# Patient Record
Sex: MALE | Race: WHITE | NOT HISPANIC OR LATINO | Employment: UNEMPLOYED | URBAN - METROPOLITAN AREA
[De-identification: names, ages, dates, MRNs, and addresses within clinical notes are randomized per-mention and may not be internally consistent; named-entity substitution may affect disease eponyms.]

---

## 2018-01-29 ENCOUNTER — RESOLUTE PROFESSIONAL BILLING HOSPITAL PROF FEE (OUTPATIENT)
Dept: HOSPITALIST | Facility: MEDICAL CENTER | Age: 38
End: 2018-01-29
Payer: COMMERCIAL

## 2018-01-29 ENCOUNTER — HOSPITAL ENCOUNTER (OUTPATIENT)
Facility: MEDICAL CENTER | Age: 38
DRG: 433 | End: 2018-01-29
Payer: COMMERCIAL

## 2018-01-29 ENCOUNTER — HOSPITAL ENCOUNTER (INPATIENT)
Facility: MEDICAL CENTER | Age: 38
LOS: 3 days | DRG: 433 | End: 2018-02-01
Attending: HOSPITALIST | Admitting: HOSPITALIST
Payer: COMMERCIAL

## 2018-01-29 PROBLEM — E83.42 HYPOMAGNESEMIA: Status: ACTIVE | Noted: 2018-01-29

## 2018-01-29 PROBLEM — F17.200 TOBACCO DEPENDENCE: Status: ACTIVE | Noted: 2018-01-29

## 2018-01-29 PROBLEM — K70.30 ALC (ALCOHOLIC LIVER CIRRHOSIS) (HCC): Status: ACTIVE | Noted: 2018-01-29

## 2018-01-29 PROBLEM — R74.01 TRANSAMINITIS: Status: ACTIVE | Noted: 2018-01-29

## 2018-01-29 PROCEDURE — 770006 HCHG ROOM/CARE - MED/SURG/GYN SEMI*

## 2018-01-29 PROCEDURE — 99223 1ST HOSP IP/OBS HIGH 75: CPT | Performed by: HOSPITALIST

## 2018-01-29 RX ORDER — ATORVASTATIN CALCIUM 20 MG/1
40 TABLET, FILM COATED ORAL DAILY
COMMUNITY

## 2018-01-29 RX ORDER — CARVEDILOL 6.25 MG/1
25 TABLET ORAL 2 TIMES DAILY WITH MEALS
Status: DISCONTINUED | OUTPATIENT
Start: 2018-01-30 | End: 2018-02-01 | Stop reason: HOSPADM

## 2018-01-29 RX ORDER — CARVEDILOL 25 MG/1
25 TABLET ORAL 2 TIMES DAILY WITH MEALS
COMMUNITY

## 2018-01-29 RX ORDER — RANITIDINE 150 MG/1
150 TABLET ORAL 2 TIMES DAILY
COMMUNITY

## 2018-01-29 RX ORDER — LISINOPRIL 5 MG/1
5 TABLET ORAL 2 TIMES DAILY
Status: DISCONTINUED | OUTPATIENT
Start: 2018-01-30 | End: 2018-02-01 | Stop reason: HOSPADM

## 2018-01-29 RX ORDER — ONDANSETRON 4 MG/1
4 TABLET, ORALLY DISINTEGRATING ORAL EVERY 4 HOURS PRN
Status: DISCONTINUED | OUTPATIENT
Start: 2018-01-29 | End: 2018-02-01 | Stop reason: HOSPADM

## 2018-01-29 RX ORDER — POLYETHYLENE GLYCOL 3350 17 G/17G
1 POWDER, FOR SOLUTION ORAL
Status: DISCONTINUED | OUTPATIENT
Start: 2018-01-29 | End: 2018-02-01 | Stop reason: HOSPADM

## 2018-01-29 RX ORDER — AMOXICILLIN 250 MG
2 CAPSULE ORAL 2 TIMES DAILY
Status: DISCONTINUED | OUTPATIENT
Start: 2018-01-30 | End: 2018-02-01 | Stop reason: HOSPADM

## 2018-01-29 RX ORDER — LISINOPRIL 10 MG/1
5 TABLET ORAL 2 TIMES DAILY
COMMUNITY

## 2018-01-29 RX ORDER — PROMETHAZINE HYDROCHLORIDE 25 MG/1
12.5-25 TABLET ORAL EVERY 4 HOURS PRN
Status: DISCONTINUED | OUTPATIENT
Start: 2018-01-29 | End: 2018-02-01 | Stop reason: HOSPADM

## 2018-01-29 RX ORDER — PROMETHAZINE HYDROCHLORIDE 25 MG/1
12.5-25 SUPPOSITORY RECTAL EVERY 4 HOURS PRN
Status: DISCONTINUED | OUTPATIENT
Start: 2018-01-29 | End: 2018-02-01 | Stop reason: HOSPADM

## 2018-01-29 RX ORDER — LORAZEPAM 2 MG/ML
0.5 INJECTION INTRAMUSCULAR EVERY 6 HOURS PRN
Status: DISCONTINUED | OUTPATIENT
Start: 2018-01-29 | End: 2018-02-01 | Stop reason: HOSPADM

## 2018-01-29 RX ORDER — NICOTINE 21 MG/24HR
21 PATCH, TRANSDERMAL 24 HOURS TRANSDERMAL
Status: DISCONTINUED | OUTPATIENT
Start: 2018-01-30 | End: 2018-02-01 | Stop reason: HOSPADM

## 2018-01-29 RX ORDER — LORAZEPAM 1 MG/1
0.5 TABLET ORAL EVERY 6 HOURS PRN
Status: DISCONTINUED | OUTPATIENT
Start: 2018-01-29 | End: 2018-02-01 | Stop reason: HOSPADM

## 2018-01-29 RX ORDER — SPIRONOLACTONE 50 MG/1
25 TABLET, FILM COATED ORAL DAILY
Status: DISCONTINUED | OUTPATIENT
Start: 2018-01-30 | End: 2018-02-01 | Stop reason: HOSPADM

## 2018-01-29 RX ORDER — OMEPRAZOLE 20 MG/1
20 CAPSULE, DELAYED RELEASE ORAL 2 TIMES DAILY
Status: DISCONTINUED | OUTPATIENT
Start: 2018-01-30 | End: 2018-02-01 | Stop reason: HOSPADM

## 2018-01-29 RX ORDER — FUROSEMIDE 20 MG/1
20 TABLET ORAL DAILY
COMMUNITY

## 2018-01-29 RX ORDER — ATORVASTATIN CALCIUM 40 MG/1
40 TABLET, FILM COATED ORAL NIGHTLY
Status: DISCONTINUED | OUTPATIENT
Start: 2018-01-30 | End: 2018-02-01 | Stop reason: HOSPADM

## 2018-01-29 RX ORDER — BISACODYL 10 MG
10 SUPPOSITORY, RECTAL RECTAL
Status: DISCONTINUED | OUTPATIENT
Start: 2018-01-29 | End: 2018-02-01 | Stop reason: HOSPADM

## 2018-01-29 RX ORDER — ONDANSETRON 2 MG/ML
4 INJECTION INTRAMUSCULAR; INTRAVENOUS EVERY 4 HOURS PRN
Status: DISCONTINUED | OUTPATIENT
Start: 2018-01-29 | End: 2018-02-01 | Stop reason: HOSPADM

## 2018-01-29 RX ORDER — SPIRONOLACTONE 25 MG/1
25 TABLET ORAL DAILY
COMMUNITY

## 2018-01-29 ASSESSMENT — LIFESTYLE VARIABLES
PACK_YEARS: 23
CONSUMPTION TOTAL: POSITIVE
TOTAL SCORE: 4
EVER FELT BAD OR GUILTY ABOUT YOUR DRINKING: YES
HAVE PEOPLE ANNOYED YOU BY CRITICIZING YOUR DRINKING: YES
TOTAL SCORE: 4
DOES PATIENT WANT TO STOP DRINKING: YES
AVERAGE NUMBER OF DAYS PER WEEK YOU HAVE A DRINK CONTAINING ALCOHOL: 4
HOW MANY TIMES IN THE PAST YEAR HAVE YOU HAD 5 OR MORE DRINKS IN A DAY: 7
EVER_SMOKED: YES
TOTAL SCORE: 4
ALCOHOL_USE: YES
ON A TYPICAL DAY WHEN YOU DRINK ALCOHOL HOW MANY DRINKS DO YOU HAVE: 2
EVER HAD A DRINK FIRST THING IN THE MORNING TO STEADY YOUR NERVES TO GET RID OF A HANGOVER: YES
DOES PATIENT WANT TO TALK TO SOMEONE ABOUT QUITTING: NO
HAVE YOU EVER FELT YOU SHOULD CUT DOWN ON YOUR DRINKING: YES

## 2018-01-29 ASSESSMENT — PATIENT HEALTH QUESTIONNAIRE - PHQ9
SUM OF ALL RESPONSES TO PHQ QUESTIONS 1-9: 0
2. FEELING DOWN, DEPRESSED, IRRITABLE, OR HOPELESS: NOT AT ALL
SUM OF ALL RESPONSES TO PHQ QUESTIONS 1-9: 0
1. LITTLE INTEREST OR PLEASURE IN DOING THINGS: NOT AT ALL
1. LITTLE INTEREST OR PLEASURE IN DOING THINGS: NOT AT ALL
SUM OF ALL RESPONSES TO PHQ9 QUESTIONS 1 AND 2: 0
2. FEELING DOWN, DEPRESSED, IRRITABLE, OR HOPELESS: NOT AT ALL
SUM OF ALL RESPONSES TO PHQ9 QUESTIONS 1 AND 2: 0

## 2018-01-29 ASSESSMENT — PAIN SCALES - GENERAL: PAINLEVEL_OUTOF10: 5

## 2018-01-30 ENCOUNTER — APPOINTMENT (OUTPATIENT)
Dept: RADIOLOGY | Facility: MEDICAL CENTER | Age: 38
DRG: 433 | End: 2018-01-30
Attending: HOSPITALIST
Payer: COMMERCIAL

## 2018-01-30 PROBLEM — E87.6 HYPOKALEMIA: Status: ACTIVE | Noted: 2018-01-30

## 2018-01-30 PROBLEM — R18.8 ASCITES: Status: ACTIVE | Noted: 2018-01-30

## 2018-01-30 LAB
ALBUMIN SERPL BCP-MCNC: 3.1 G/DL (ref 3.2–4.9)
ALBUMIN/GLOB SERPL: 1.6 G/DL
ALP SERPL-CCNC: 234 U/L (ref 30–99)
ALT SERPL-CCNC: 50 U/L (ref 2–50)
ANION GAP SERPL CALC-SCNC: 9 MMOL/L (ref 0–11.9)
APPEARANCE FLD: CLEAR
APTT PPP: 34 SEC (ref 24.7–36)
AST SERPL-CCNC: 155 U/L (ref 12–45)
BASOPHILS # BLD AUTO: 0.8 % (ref 0–1.8)
BASOPHILS # BLD: 0.05 K/UL (ref 0–0.12)
BILIRUB SERPL-MCNC: 8 MG/DL (ref 0.1–1.5)
BODY FLD TYPE: NORMAL
BODY FLD TYPE: NORMAL
BUN SERPL-MCNC: 6 MG/DL (ref 8–22)
CALCIUM SERPL-MCNC: 7.9 MG/DL (ref 8.5–10.5)
CHLORIDE SERPL-SCNC: 102 MMOL/L (ref 96–112)
CO2 SERPL-SCNC: 24 MMOL/L (ref 20–33)
COLOR FLD: YELLOW
CREAT SERPL-MCNC: 0.65 MG/DL (ref 0.5–1.4)
CSF COMMENTS 1658: NORMAL
CYTOLOGY REG CYTOL: NORMAL
EOSINOPHIL # BLD AUTO: 0.12 K/UL (ref 0–0.51)
EOSINOPHIL NFR BLD: 2 % (ref 0–6.9)
ERYTHROCYTE [DISTWIDTH] IN BLOOD BY AUTOMATED COUNT: 50.8 FL (ref 35.9–50)
GLOBULIN SER CALC-MCNC: 2 G/DL (ref 1.9–3.5)
GLUCOSE SERPL-MCNC: 78 MG/DL (ref 65–99)
GRAM STN SPEC: NORMAL
HCT VFR BLD AUTO: 31.4 % (ref 42–52)
HGB BLD-MCNC: 11.2 G/DL (ref 14–18)
HISTIOCYTES NFR FLD: 4 %
IMM GRANULOCYTES # BLD AUTO: 0.02 K/UL (ref 0–0.11)
IMM GRANULOCYTES NFR BLD AUTO: 0.3 % (ref 0–0.9)
INR PPP: 1.49 (ref 0.87–1.13)
LYMPHOCYTES # BLD AUTO: 1.31 K/UL (ref 1–4.8)
LYMPHOCYTES NFR BLD: 22.2 % (ref 22–41)
LYMPHOCYTES NFR FLD: 22 %
MAGNESIUM SERPL-MCNC: 1.7 MG/DL (ref 1.5–2.5)
MCH RBC QN AUTO: 39.3 PG (ref 27–33)
MCHC RBC AUTO-ENTMCNC: 35.7 G/DL (ref 33.7–35.3)
MCV RBC AUTO: 110.2 FL (ref 81.4–97.8)
MESOTHL CELL NFR FLD: 47 %
MONOCYTES # BLD AUTO: 1.02 K/UL (ref 0–0.85)
MONOCYTES NFR BLD AUTO: 17.3 % (ref 0–13.4)
MONONUC CELLS NFR FLD: 27 %
NEUTROPHILS # BLD AUTO: 3.38 K/UL (ref 1.82–7.42)
NEUTROPHILS NFR BLD: 57.4 % (ref 44–72)
NRBC # BLD AUTO: 0 K/UL
NRBC BLD-RTO: 0 /100 WBC
PHOSPHATE SERPL-MCNC: 2.9 MG/DL (ref 2.5–4.5)
PLATELET # BLD AUTO: 129 K/UL (ref 164–446)
PMV BLD AUTO: 10.3 FL (ref 9–12.9)
POTASSIUM SERPL-SCNC: 3.5 MMOL/L (ref 3.6–5.5)
PROT FLD-MCNC: <3 G/DL
PROT SERPL-MCNC: 5.1 G/DL (ref 6–8.2)
PROTHROMBIN TIME: 17.7 SEC (ref 12–14.6)
RBC # BLD AUTO: 2.85 M/UL (ref 4.7–6.1)
RBC # FLD: <2000 CELLS/UL
SIGNIFICANT IND 70042: NORMAL
SITE SITE: NORMAL
SODIUM SERPL-SCNC: 135 MMOL/L (ref 135–145)
SOURCE SOURCE: NORMAL
WBC # BLD AUTO: 5.9 K/UL (ref 4.8–10.8)
WBC # FLD: 98 CELLS/UL

## 2018-01-30 PROCEDURE — 99232 SBSQ HOSP IP/OBS MODERATE 35: CPT | Performed by: INTERNAL MEDICINE

## 2018-01-30 PROCEDURE — 0W9G3ZZ DRAINAGE OF PERITONEAL CAVITY, PERCUTANEOUS APPROACH: ICD-10-PCS | Performed by: RADIOLOGY

## 2018-01-30 PROCEDURE — 87205 SMEAR GRAM STAIN: CPT

## 2018-01-30 PROCEDURE — 770006 HCHG ROOM/CARE - MED/SURG/GYN SEMI*

## 2018-01-30 PROCEDURE — 85730 THROMBOPLASTIN TIME PARTIAL: CPT

## 2018-01-30 PROCEDURE — 89051 BODY FLUID CELL COUNT: CPT

## 2018-01-30 PROCEDURE — 87070 CULTURE OTHR SPECIMN AEROBIC: CPT

## 2018-01-30 PROCEDURE — 80053 COMPREHEN METABOLIC PANEL: CPT

## 2018-01-30 PROCEDURE — A9270 NON-COVERED ITEM OR SERVICE: HCPCS | Performed by: HOSPITALIST

## 2018-01-30 PROCEDURE — 88112 CYTOPATH CELL ENHANCE TECH: CPT

## 2018-01-30 PROCEDURE — 85610 PROTHROMBIN TIME: CPT

## 2018-01-30 PROCEDURE — 85025 COMPLETE CBC W/AUTO DIFF WBC: CPT

## 2018-01-30 PROCEDURE — 83735 ASSAY OF MAGNESIUM: CPT

## 2018-01-30 PROCEDURE — 36415 COLL VENOUS BLD VENIPUNCTURE: CPT

## 2018-01-30 PROCEDURE — 700102 HCHG RX REV CODE 250 W/ 637 OVERRIDE(OP): Performed by: HOSPITALIST

## 2018-01-30 PROCEDURE — A9270 NON-COVERED ITEM OR SERVICE: HCPCS | Performed by: INTERNAL MEDICINE

## 2018-01-30 PROCEDURE — 49083 ABD PARACENTESIS W/IMAGING: CPT

## 2018-01-30 PROCEDURE — 700102 HCHG RX REV CODE 250 W/ 637 OVERRIDE(OP): Performed by: INTERNAL MEDICINE

## 2018-01-30 PROCEDURE — 88305 TISSUE EXAM BY PATHOLOGIST: CPT

## 2018-01-30 PROCEDURE — 84157 ASSAY OF PROTEIN OTHER: CPT

## 2018-01-30 PROCEDURE — 84100 ASSAY OF PHOSPHORUS: CPT

## 2018-01-30 PROCEDURE — 94660 CPAP INITIATION&MGMT: CPT

## 2018-01-30 RX ORDER — MULTIVIT-MIN/FOLIC/VIT K/LYCOP 400-300MCG
1 TABLET ORAL DAILY
COMMUNITY

## 2018-01-30 RX ORDER — POTASSIUM CHLORIDE 20 MEQ/1
40 TABLET, EXTENDED RELEASE ORAL ONCE
Status: COMPLETED | OUTPATIENT
Start: 2018-01-30 | End: 2018-01-30

## 2018-01-30 RX ADMIN — OMEPRAZOLE 20 MG: 20 CAPSULE, DELAYED RELEASE ORAL at 19:48

## 2018-01-30 RX ADMIN — SPIRONOLACTONE 25 MG: 50 TABLET ORAL at 07:59

## 2018-01-30 RX ADMIN — ATORVASTATIN CALCIUM 40 MG: 40 TABLET, FILM COATED ORAL at 00:01

## 2018-01-30 RX ADMIN — ATORVASTATIN CALCIUM 40 MG: 40 TABLET, FILM COATED ORAL at 19:48

## 2018-01-30 RX ADMIN — OMEPRAZOLE 20 MG: 20 CAPSULE, DELAYED RELEASE ORAL at 08:00

## 2018-01-30 RX ADMIN — OMEPRAZOLE 20 MG: 20 CAPSULE, DELAYED RELEASE ORAL at 00:00

## 2018-01-30 RX ADMIN — CARVEDILOL 25 MG: 6.25 TABLET, FILM COATED ORAL at 17:40

## 2018-01-30 RX ADMIN — LISINOPRIL 5 MG: 5 TABLET ORAL at 19:48

## 2018-01-30 RX ADMIN — POTASSIUM CHLORIDE 40 MEQ: 1500 TABLET, EXTENDED RELEASE ORAL at 11:21

## 2018-01-30 ASSESSMENT — PAIN SCALES - GENERAL
PAINLEVEL_OUTOF10: 6

## 2018-01-30 ASSESSMENT — PATIENT HEALTH QUESTIONNAIRE - PHQ9
SUM OF ALL RESPONSES TO PHQ9 QUESTIONS 1 AND 2: 0
SUM OF ALL RESPONSES TO PHQ QUESTIONS 1-9: 0
1. LITTLE INTEREST OR PLEASURE IN DOING THINGS: NOT AT ALL
2. FEELING DOWN, DEPRESSED, IRRITABLE, OR HOPELESS: NOT AT ALL

## 2018-01-30 ASSESSMENT — ENCOUNTER SYMPTOMS
ABDOMINAL PAIN: 0
NECK PAIN: 0
VOMITING: 0
NAUSEA: 0
MYALGIAS: 0
PALPITATIONS: 0
CHILLS: 0
SHORTNESS OF BREATH: 0
FEVER: 0
COUGH: 0

## 2018-01-30 NOTE — PROGRESS NOTES
Patient came down for a paracentesis.  Dr Han came and consented patient.  He performed the paracentesis.  Vitals were monitored during exam.  Removed 2000 ml and sent 1000 ml to lab.  Patient tolerated procedure well and left in stable condition

## 2018-01-30 NOTE — PROGRESS NOTES
[facility] Hospitalist Progress Note    Date of Service: 2018    Chief Complaint  37 y.o. male [facility]  [facility] also found to have transaminitis and ascites      Interval Problem Update  Pt seen and examined, afebrile, no overnight events, denies any abdominal pain,     Consultants/Specialty  None     Disposition  Home when medically cleared         Review of Systems   Constitutional: Negative for chills and fever.   HENT: Negative for hearing loss.    Respiratory: Negative for cough and shortness of breath.    Cardiovascular: Negative for chest pain and palpitations.   Gastrointestinal: Negative for abdominal pain, nausea and vomiting.   Genitourinary: Negative for dysuria and urgency.   Musculoskeletal: Negative for myalgias and neck pain.      Physical Exam  Laboratory/Imaging   Hemodynamics  Temp (24hrs), Av.8 °C (98.2 °F), Min:36.4 °C (97.6 °F), Max:37.1 °C (98.8 °F)   Temperature: 36.4 °C (97.6 °F)  Pulse  Av.2  Min: 73  Max: 88    Blood Pressure: 104/76      Respiratory      Respiration: 18, Pulse Oximetry: 98 %        RUL Breath Sounds: Clear, RML Breath Sounds: Clear, RLL Breath Sounds: Clear, DENNIS Breath Sounds: Clear, LLL Breath Sounds: Clear    Fluids    Intake/Output Summary (Last 24 hours) at 18 1520  Last data filed at 18 0744   Gross per 24 hour   Intake              200 ml   Output                0 ml   Net              200 ml       Nutrition  Orders Placed This Encounter   Procedures   • Diet Order     Standing Status:   Standing     Number of Occurrences:   1     Order Specific Question:   Diet:     Answer:   Regular [1]     Physical Exam   Constitutional: He is oriented to person, place, and time.   HENT:   Head: Normocephalic and atraumatic.   Eyes: Conjunctivae are normal.   Neck: Neck supple. No JVD present.   Cardiovascular: Normal rate.  Exam reveals no gallop.    No murmur heard.  Pulmonary/Chest: Effort normal and breath sounds normal. He has no wheezes.  He has no rales.   Abdominal: Soft. Bowel sounds are normal. He exhibits distension. There is no tenderness.   Musculoskeletal: Normal range of motion. He exhibits no edema.   Neurological: He is alert and oriented to person, place, and time. No cranial nerve deficit.   Skin: Skin is warm and dry. No erythema.   Nursing note and vitals reviewed.      Recent Labs      01/30/18   0256   WBC  5.9   RBC  2.85*   HEMOGLOBIN  11.2*   HEMATOCRIT  31.4*   MCV  110.2*   MCH  39.3*   MCHC  35.7*   RDW  50.8*   PLATELETCT  129*   MPV  10.3     Recent Labs      01/30/18   0254   SODIUM  135   POTASSIUM  3.5*   CHLORIDE  102   CO2  24   GLUCOSE  78   BUN  6*   CREATININE  0.65   CALCIUM  7.9*     Recent Labs      01/30/18   0254   APTT  34.0   INR  1.49*                  Assessment/Plan     * Transaminitis   Assessment & Plan    Possible due to his alcoholic liver cirrhosis  If continues to rise consider liver US..          Ascites   Assessment & Plan    Due to alcoholic liver cirrhosis   Paracentesis done          Hypokalemia   Assessment & Plan    Replete and monitor         ALC (alcoholic liver cirrhosis) (CMS-HCC)   Assessment & Plan    As above         Tobacco dependence   Assessment & Plan    Counseled on cessation         Hypomagnesemia   Assessment & Plan    Replete and monitor             Reviewed items::  Labs reviewed, Radiology images reviewed and Medications reviewed  Polk catheter::  No Polk  DVT prophylaxis - mechanical:  SCDs

## 2018-01-30 NOTE — PROGRESS NOTES
Direct admit from [facility]. Accepted by Dr. Kellogg for acute renal failure.  ADT signed & held, needs to be released upon pt arrival.  No written orders received.  Pt coming by ground.

## 2018-01-30 NOTE — PROGRESS NOTES
Received shift report from off going nurse.  Patient alert and oriented x4. Was reported patient no problems overnight.  Patient progressing as planned.  Call light in reach and fall precautions in place.  Patient asked to call for assistance if needed.  All items in reach bed low for safety.  Patient educated on Plan of Care.  Patient not at risk for falls.

## 2018-01-30 NOTE — H&P
DATE OF ADMISSION:  01/29/2018.    PRIMARY CARE PHYSICIAN:  At the [facility] in [place].    CHIEF COMPLAINT:  He was transferred here from the [facility] for unknown   reasons.    HISTORY OF PRESENT ILLNESS:  Patient is a 37-year-old male with a history of   alcoholic liver cirrhosis as well as alcoholic congestive heart failure.  He   presented to the [facility] complaining of abdominal distention and he states   that he awakened in the morning was having noticed the stomach was much more   swollen than usual.  He was also having some discomfort.  His friends noticed   that his skin was yellowish discoloration.  So, he presented to the [facility].  At the [facility], he was found to have some electrolyte   abnormalities.  He was also found to have a total bilirubin of 10.2.  He   underwent further imaging including a CT which showed ascites and   paraesophageal varices, but the common bile duct was normal.  He may have had   an ultrasound, but according to the transfer records, an MRCP was refused by   the radiologist because the common bile duct was within normal limits and   there is no evidence of any stones.  The CT also showed evidence of   paraesophageal varices, but patient was told that he needs to have an upper   endoscopy.  He adamantly denies any sign of hemoptysis.  He denies any   hematemesis and no blood in stool.  He reports that his last drink was over 3   weeks ago.  So, it is not quite clear why the [facility], transferred him   here.    REVIEW OF SYSTEMS:  As per HPI.  All other systems have been reviewed and are   negative.    PAST MEDICAL HISTORY:  1.  Obstructive sleep apnea, on CPAP at night.  2.  Alcohol dependence.  3.  Alcoholic liver cirrhosis.  4.  Alcohol withdrawal seizures.  5.  Congestive heart failure related to alcoholism.  6.  PTSD.  7.  Hyperlipidemia.  8.  GERD.    PAST SURGICAL HISTORY:  1.  He has had a right inguinal hernia repair.  2.  Right shoulder surgery.  3.   He also reports he had some type of upper endoscopy and needed some   procedure.    FAMILY HISTORY:  Significant for breast cancer in his mother and testicular   cancer in his brother.    SOCIAL HISTORY:  Smokes 1 pack per day.  He states that he does not drink   anymore, but prior to 3 weeks, he was drinking quite heavily.  He denies drug   use.    ALLERGIES:  MOTRIN.    HOME MEDICATIONS:  1.  Ranitidine.  2.  Lipitor 40 mg daily.  3.  Carvedilol 25 mg b.i.d.  4.  Lasix 20 mg daily.  5.  Lisinopril 5 mg b.i.d.  6.  Aldactone 25 mg daily.    PHYSICAL EXAMINATION:  VITAL SIGNS:  Blood pressure is 106/75, pulse of 73, respirations 18,   temperature 97.1, oxygen saturation 98% on room air, weight 68.8 kilograms.  GENERAL:  Patient appears chronically ill, currently not in any acute   distress.  HEENT:  Moist mucous membranes, oropharyngeal exudates.  Eyes, EOMI, PERRLA.  NECK:  No lymphadenopathy, no JVD.  CARDIOVASCULAR:  Regular rate and rhythm.  Positive murmur.  LUNGS:  Clear to auscultation bilaterally.  Decreased air entry at the bases.  ABDOMEN:  Distended, positive fluid wave.  Nontender.  EXTREMITIES:  No clubbing or cyanosis.  NEUROLOGIC:  Awake, alert, and oriented to person, place, time, and situation.    LABORATORY DATA:  At the outlying facility showed sodium 135, potassium 3.5,   BUN 7, creatinine 0.8.  WBC 7.47, hemoglobin 12.6, hematocrit 36.5, ,   platelets 148, , ALT 69.  INR 1.6.  Lactic acid 0.7, total bilirubin   10.2, lipase 215, magnesium 1.4, phosphorus 2.8.    ASSESSMENT AND PLAN:  1.  Patient is a 37-year-old male who was transferred here from the [facility] in order to have an upper endoscopy and an MRCP, although he does not   need either.  2.  Transaminitis in the setting of alcoholic liver cirrhosis.  Again imaging   at the outlying facility did not reveal any CBD dilation.  So an MRI is not   warranted.  We will continue with his home medications and I have encouraged    him regarding alcohol cessation.  3.  Macrocytic anemia secondary to alcoholism.  4.  Hypomagnesemia.  We will recheck labs in the morning.  It is not clear if   they replenish his magnesium levels or not.  [facility] 1.4.  5.  His hypokalemia:  Again, we will check his labs in the morning.  It is not   clear, they have replenished his potassium either.  6.  Coagulopathy secondary to liver cirrhosis.  7.  Paraesophageal varices.  We will put him on a PPI b.i.d.  8.  We will have IR do a paracentesis.  9.  Ongoing tobacco dependence:  We will provide him with a nicotine patch.       ____________________________________     MD NAEEM Manning / AGUSTÍN    DD:  01/30/2018 00:47:32  DT:  01/30/2018 01:27:07    D#:  [id number]  Job#:  [id number]

## 2018-01-30 NOTE — PROGRESS NOTES
Patient noted to have soft blood pressure.  Patient scheduled to have paracentesis.  Meds held as paracentesis anticipated to lower pressure further.

## 2018-01-31 ENCOUNTER — APPOINTMENT (OUTPATIENT)
Dept: RADIOLOGY | Facility: MEDICAL CENTER | Age: 38
DRG: 433 | End: 2018-01-31
Attending: INTERNAL MEDICINE
Payer: COMMERCIAL

## 2018-01-31 LAB
ALBUMIN SERPL BCP-MCNC: 3.3 G/DL (ref 3.2–4.9)
ALBUMIN/GLOB SERPL: 1.7 G/DL
ALP SERPL-CCNC: 246 U/L (ref 30–99)
ALT SERPL-CCNC: 48 U/L (ref 2–50)
ANION GAP SERPL CALC-SCNC: 7 MMOL/L (ref 0–11.9)
AST SERPL-CCNC: 135 U/L (ref 12–45)
BILIRUB SERPL-MCNC: 6.9 MG/DL (ref 0.1–1.5)
BUN SERPL-MCNC: 5 MG/DL (ref 8–22)
CALCIUM SERPL-MCNC: 8.5 MG/DL (ref 8.5–10.5)
CHLORIDE SERPL-SCNC: 104 MMOL/L (ref 96–112)
CO2 SERPL-SCNC: 25 MMOL/L (ref 20–33)
CREAT SERPL-MCNC: 0.62 MG/DL (ref 0.5–1.4)
ERYTHROCYTE [DISTWIDTH] IN BLOOD BY AUTOMATED COUNT: 53.5 FL (ref 35.9–50)
GLOBULIN SER CALC-MCNC: 2 G/DL (ref 1.9–3.5)
GLUCOSE SERPL-MCNC: 97 MG/DL (ref 65–99)
HCT VFR BLD AUTO: 35.1 % (ref 42–52)
HGB BLD-MCNC: 11.6 G/DL (ref 14–18)
MAGNESIUM SERPL-MCNC: 1.7 MG/DL (ref 1.5–2.5)
MCH RBC QN AUTO: 37.2 PG (ref 27–33)
MCHC RBC AUTO-ENTMCNC: 33 G/DL (ref 33.7–35.3)
MCV RBC AUTO: 112.5 FL (ref 81.4–97.8)
PLATELET # BLD AUTO: 134 K/UL (ref 164–446)
PMV BLD AUTO: 10.1 FL (ref 9–12.9)
POTASSIUM SERPL-SCNC: 3.9 MMOL/L (ref 3.6–5.5)
PROT SERPL-MCNC: 5.3 G/DL (ref 6–8.2)
RBC # BLD AUTO: 3.12 M/UL (ref 4.7–6.1)
SODIUM SERPL-SCNC: 136 MMOL/L (ref 135–145)
WBC # BLD AUTO: 5.6 K/UL (ref 4.8–10.8)

## 2018-01-31 PROCEDURE — 36415 COLL VENOUS BLD VENIPUNCTURE: CPT

## 2018-01-31 PROCEDURE — 76705 ECHO EXAM OF ABDOMEN: CPT

## 2018-01-31 PROCEDURE — 85027 COMPLETE CBC AUTOMATED: CPT

## 2018-01-31 PROCEDURE — 700102 HCHG RX REV CODE 250 W/ 637 OVERRIDE(OP): Performed by: HOSPITALIST

## 2018-01-31 PROCEDURE — A9270 NON-COVERED ITEM OR SERVICE: HCPCS | Performed by: HOSPITALIST

## 2018-01-31 PROCEDURE — 80053 COMPREHEN METABOLIC PANEL: CPT

## 2018-01-31 PROCEDURE — 99232 SBSQ HOSP IP/OBS MODERATE 35: CPT | Performed by: INTERNAL MEDICINE

## 2018-01-31 PROCEDURE — 83735 ASSAY OF MAGNESIUM: CPT

## 2018-01-31 PROCEDURE — 770006 HCHG ROOM/CARE - MED/SURG/GYN SEMI*

## 2018-01-31 RX ADMIN — ATORVASTATIN CALCIUM 40 MG: 40 TABLET, FILM COATED ORAL at 20:11

## 2018-01-31 RX ADMIN — OMEPRAZOLE 20 MG: 20 CAPSULE, DELAYED RELEASE ORAL at 08:05

## 2018-01-31 RX ADMIN — CARVEDILOL 25 MG: 6.25 TABLET, FILM COATED ORAL at 08:05

## 2018-01-31 RX ADMIN — OMEPRAZOLE 20 MG: 20 CAPSULE, DELAYED RELEASE ORAL at 20:11

## 2018-01-31 RX ADMIN — STANDARDIZED SENNA CONCENTRATE AND DOCUSATE SODIUM 2 TABLET: 8.6; 5 TABLET, FILM COATED ORAL at 08:06

## 2018-01-31 RX ADMIN — SPIRONOLACTONE 25 MG: 50 TABLET ORAL at 08:06

## 2018-01-31 RX ADMIN — LISINOPRIL 5 MG: 5 TABLET ORAL at 20:11

## 2018-01-31 RX ADMIN — LISINOPRIL 5 MG: 5 TABLET ORAL at 08:05

## 2018-01-31 ASSESSMENT — ENCOUNTER SYMPTOMS
COUGH: 0
FEVER: 0
MYALGIAS: 0
VOMITING: 0
PALPITATIONS: 0
ABDOMINAL PAIN: 0
SHORTNESS OF BREATH: 0
NAUSEA: 0
NECK PAIN: 0
CHILLS: 0

## 2018-01-31 ASSESSMENT — PAIN SCALES - GENERAL
PAINLEVEL_OUTOF10: 6
PAINLEVEL_OUTOF10: 6
PAINLEVEL_OUTOF10: 3
PAINLEVEL_OUTOF10: 3

## 2018-01-31 NOTE — PROGRESS NOTES
[facility] Hospitalist Progress Note    Date of Service: 2018    Chief Complaint  37 y.o. male [facility] also found to have transaminitis and ascites      Interval Problem Update  Pt seen and examined, afebrile, no overnight events, denies any abdominal pain, carmelo check US liver.     Consultants/Specialty  None     Disposition  Home when medically cleared         Review of Systems   Constitutional: Negative for chills and fever.   HENT: Negative for hearing loss.    Respiratory: Negative for cough and shortness of breath.    Cardiovascular: Negative for chest pain and palpitations.   Gastrointestinal: Negative for abdominal pain, nausea and vomiting.   Genitourinary: Negative for dysuria and urgency.   Musculoskeletal: Negative for myalgias and neck pain.      Physical Exam  Laboratory/Imaging   Hemodynamics  Temp (24hrs), Av.7 °C (98 °F), Min:36.3 °C (97.4 °F), Max:37 °C (98.6 °F)   Temperature: 36.7 °C (98.1 °F)  Pulse  Av.6  Min: 72  Max: 88    Blood Pressure: 100/71      Respiratory      Respiration: 18, Pulse Oximetry: 95 %, O2 Daily Delivery Respiratory : Room Air with O2 Available        RUL Breath Sounds: Clear, RML Breath Sounds: Clear, RLL Breath Sounds: Clear, DENNIS Breath Sounds: Clear, LLL Breath Sounds: Clear    Fluids    Intake/Output Summary (Last 24 hours) at 18 1411  Last data filed at 18 0830   Gross per 24 hour   Intake              460 ml   Output                0 ml   Net              460 ml       Nutrition  Orders Placed This Encounter   Procedures   • Diet Order     Standing Status:   Standing     Number of Occurrences:   1     Order Specific Question:   Diet:     Answer:   Regular [1]     Physical Exam   Constitutional: He is oriented to person, place, and time.   HENT:   Head: Normocephalic and atraumatic.   Eyes: Conjunctivae are normal.   Neck: Neck supple. No JVD present.   Cardiovascular: Normal rate.  Exam reveals no gallop.    No murmur  heard.  Pulmonary/Chest: Effort normal and breath sounds normal. He has no wheezes. He has no rales.   Abdominal: Soft. Bowel sounds are normal. He exhibits distension. There is no tenderness.   Musculoskeletal: Normal range of motion. He exhibits no edema.   Neurological: He is alert and oriented to person, place, and time. No cranial nerve deficit.   Skin: Skin is warm and dry. No erythema.   Nursing note and vitals reviewed.      Recent Labs      01/30/18   0256  01/31/18   0244   WBC  5.9  5.6   RBC  2.85*  3.12*   HEMOGLOBIN  11.2*  11.6*   HEMATOCRIT  31.4*  35.1*   MCV  110.2*  112.5*   MCH  39.3*  37.2*   MCHC  35.7*  33.0*   RDW  50.8*  53.5*   PLATELETCT  129*  134*   MPV  10.3  10.1     Recent Labs      01/30/18   0254  01/31/18   0244   SODIUM  135  136   POTASSIUM  3.5*  3.9   CHLORIDE  102  104   CO2  24  25   GLUCOSE  78  97   BUN  6*  5*   CREATININE  0.65  0.62   CALCIUM  7.9*  8.5     Recent Labs      01/30/18   0254   APTT  34.0   INR  1.49*                  Assessment/Plan     * Transaminitis   Assessment & Plan    Possible due to his alcoholic liver cirrhosis  Slowly trending down  US liver pending           Ascites   Assessment & Plan    Due to alcoholic liver cirrhosis   Paracentesis done          Hypokalemia   Assessment & Plan    Replete and monitor         ALC (alcoholic liver cirrhosis) (CMS-HCC)   Assessment & Plan    As above         Tobacco dependence   Assessment & Plan    Counseled on cessation         Hypomagnesemia   Assessment & Plan    Replete and monitor             Reviewed items::  Labs reviewed, Radiology images reviewed and Medications reviewed  Polk catheter::  No Polk  DVT prophylaxis - mechanical:  SCDs

## 2018-01-31 NOTE — PROGRESS NOTES
Pt AOx4, up with no assistance, calls appropriately. VSS on RA. Pain controlled, denies need for pain medications. PIV SL. Pt has a Right Lower abdominal puncture site from Paracentesis yesterday. +BS, +Flatus, +BM last night. Tolerating a regular diet, pt made NPO for liver US this afternoon. All needs met at this time.

## 2018-02-01 ENCOUNTER — PATIENT OUTREACH (OUTPATIENT)
Dept: HEALTH INFORMATION MANAGEMENT | Facility: OTHER | Age: 38
End: 2018-02-01

## 2018-02-01 ENCOUNTER — APPOINTMENT (OUTPATIENT)
Dept: RADIOLOGY | Facility: MEDICAL CENTER | Age: 38
DRG: 433 | End: 2018-02-01
Attending: INTERNAL MEDICINE
Payer: COMMERCIAL

## 2018-02-01 VITALS
BODY MASS INDEX: 25.27 KG/M2 | WEIGHT: 151.68 LBS | RESPIRATION RATE: 20 BRPM | TEMPERATURE: 97.9 F | DIASTOLIC BLOOD PRESSURE: 70 MMHG | HEART RATE: 58 BPM | HEIGHT: 65 IN | SYSTOLIC BLOOD PRESSURE: 107 MMHG | OXYGEN SATURATION: 100 %

## 2018-02-01 LAB
ALBUMIN SERPL BCP-MCNC: 2.8 G/DL (ref 3.2–4.9)
ALBUMIN/GLOB SERPL: 1.6 G/DL
ALP SERPL-CCNC: 203 U/L (ref 30–99)
ALT SERPL-CCNC: 38 U/L (ref 2–50)
ANION GAP SERPL CALC-SCNC: 5 MMOL/L (ref 0–11.9)
AST SERPL-CCNC: 100 U/L (ref 12–45)
BILIRUB SERPL-MCNC: 5.3 MG/DL (ref 0.1–1.5)
BUN SERPL-MCNC: 6 MG/DL (ref 8–22)
CALCIUM SERPL-MCNC: 8.2 MG/DL (ref 8.5–10.5)
CHLORIDE SERPL-SCNC: 108 MMOL/L (ref 96–112)
CO2 SERPL-SCNC: 25 MMOL/L (ref 20–33)
CREAT SERPL-MCNC: 0.68 MG/DL (ref 0.5–1.4)
ERYTHROCYTE [DISTWIDTH] IN BLOOD BY AUTOMATED COUNT: 54 FL (ref 35.9–50)
GLOBULIN SER CALC-MCNC: 1.8 G/DL (ref 1.9–3.5)
GLUCOSE SERPL-MCNC: 86 MG/DL (ref 65–99)
HCT VFR BLD AUTO: 31.8 % (ref 42–52)
HGB BLD-MCNC: 10.6 G/DL (ref 14–18)
MCH RBC QN AUTO: 38 PG (ref 27–33)
MCHC RBC AUTO-ENTMCNC: 33.3 G/DL (ref 33.7–35.3)
MCV RBC AUTO: 114 FL (ref 81.4–97.8)
PLATELET # BLD AUTO: 138 K/UL (ref 164–446)
PMV BLD AUTO: 10.5 FL (ref 9–12.9)
POTASSIUM SERPL-SCNC: 4.1 MMOL/L (ref 3.6–5.5)
PROT SERPL-MCNC: 4.6 G/DL (ref 6–8.2)
RBC # BLD AUTO: 2.79 M/UL (ref 4.7–6.1)
SODIUM SERPL-SCNC: 138 MMOL/L (ref 135–145)
WBC # BLD AUTO: 5.6 K/UL (ref 4.8–10.8)

## 2018-02-01 PROCEDURE — 85027 COMPLETE CBC AUTOMATED: CPT

## 2018-02-01 PROCEDURE — 80053 COMPREHEN METABOLIC PANEL: CPT

## 2018-02-01 PROCEDURE — 78227 HEPATOBIL SYST IMAGE W/DRUG: CPT

## 2018-02-01 PROCEDURE — 36415 COLL VENOUS BLD VENIPUNCTURE: CPT

## 2018-02-01 PROCEDURE — 99239 HOSP IP/OBS DSCHRG MGMT >30: CPT | Performed by: INTERNAL MEDICINE

## 2018-02-01 ASSESSMENT — PAIN SCALES - GENERAL: PAINLEVEL_OUTOF10: 3

## 2018-02-01 NOTE — DISCHARGE SUMMARY
CHIEF COMPLAINT ON ADMISSION  No chief complaint on file.      CODE STATUS  Full Code    HPI & HOSPITAL COURSE  This is a 37 y.o. Male who [facility] ands also transaminitis . His kidney function was checked here and was normal, he also had transaminities and also elevated bilirubin, pt has history of alcoholic liver cirrhosis, he was found to have ascites which was taped, and also had HIDA scan was neg for acute cholecystitis but showed some decrease EF of gallbladder, pt otherwise is asymptomatic. His LFT and T. Bili trended down. He is feeling well today, and would like to go home.   Pt has been hemodynamically stable. Pt will be discharged home today and will need to follow up as outpt with PCP. Discussed pt that in the future might need to follow up as outpt with surgery  For possible elective cholecystectomy.     The patient met 2-midnight criteria for an inpatient stay at the time of discharge.    Therefore, he is discharged in fair and stable condition with close outpatient follow-up.    DISCHARGE PROBLEM LIST  Principal Problem:    Transaminitis POA: Unknown    Hypomagnesemia POA: Unknown    Tobacco dependence POA: Unknown    ALC (alcoholic liver cirrhosis) (CMS-HCC) POA: Unknown    Hypokalemia POA: Unknown    Ascites POA: Unknown        FOLLOW UP  No future appointments.  No follow-up provider specified.    MEDICATIONS ON DISCHARGE   Cecilio Deepak Lila   [facility] Medication Instructions TRACY:[id number]    Printed on:02/01/18 1400   Medication Information                      atorvastatin (LIPITOR) 20 MG Tab  Take 40 mg by mouth every day.             carvedilol (COREG) 25 MG Tab  Take 25 mg by mouth 2 times a day, with meals.             furosemide (LASIX) 20 MG Tab  Take 20 mg by mouth every day.             lisinopril (PRINIVIL) 10 MG Tab  Take 5 mg by mouth 2 times a day.             Multiple Vitamins-Minerals (ONE-A-DAY [employer] FORMULA) Tab  Take 1  Tab by mouth every day.             ranitidine (ZANTAC) 150 MG Tab  Take 150 mg by mouth 2 times a day.             spironolactone (ALDACTONE) 25 MG Tab  Take 25 mg by mouth every day.                 DIET  Orders Placed This Encounter   Procedures   • Diet Order     Standing Status:   Standing     Number of Occurrences:   1     Order Specific Question:   Diet:     Answer:   Regular [1]       ACTIVITY  As tolerated.      CONSULTATIONS  None     PROCEDURES  None   LABORATORY  Lab Results   Component Value Date/Time    SODIUM 138 02/01/2018 03:32 AM    POTASSIUM 4.1 02/01/2018 03:32 AM    CHLORIDE 108 02/01/2018 03:32 AM    CO2 25 02/01/2018 03:32 AM    GLUCOSE 86 02/01/2018 03:32 AM    BUN 6 (L) 02/01/2018 03:32 AM    CREATININE 0.68 02/01/2018 03:32 AM        Lab Results   Component Value Date/Time    WBC 5.6 02/01/2018 03:32 AM    HEMOGLOBIN 10.6 (L) 02/01/2018 03:32 AM    HEMATOCRIT 31.8 (L) 02/01/2018 03:32 AM    PLATELETCT 138 (L) 02/01/2018 03:32 AM        Total time of the discharge process exceeds 40 minutes

## 2018-02-01 NOTE — PROGRESS NOTES
Pt wife called this evening worried stating she was not informed her  was in the hospital until tonight when he texted her. Explained to wife that we don't notify when people are over the age of 18 and are AOx4, pt gave permission for this RN to update wife. Wife updated on status. Pts wife is currently in [place], pt is staying in [place], [place] with friends. Pts wife stated she is very concerned for pts health, particularly because the friend he is staying with who does not drink is going out of town for a week and patient will be home alone with his service dog, wife is concerned pt will start drinking again. Explained to wife that unfortunately we can't control all social situations and if he is medically cleared he will be dc'd, we can have SW speak with him about resources for Alcoholism but ultimately he will have to make the decision to go or not. Wife understands and would like updates if possible. Pt gave permission for us to call wife with any changes or updates. All other needs met. This RN spent extensive time with pt discussing options pt states he will be fine and that while the person he is staying with is going out of town he has friends nearby that don't drink and the patient does not have a car to drive so he will not be able to go purchase alcohol. All questions answered and needs met.

## 2018-02-01 NOTE — PROGRESS NOTES
Discussed D/C instructions with patient.No questions at this time. Removed PIV. Patient aware he [facility] and [facility].

## 2018-02-02 LAB
BACTERIA FLD AEROBE CULT: NORMAL
GRAM STN SPEC: NORMAL
SIGNIFICANT IND 70042: NORMAL
SITE SITE: NORMAL
SOURCE SOURCE: NORMAL

## 2018-03-30 ENCOUNTER — HOSPITAL ENCOUNTER (OUTPATIENT)
Dept: RADIOLOGY | Facility: MEDICAL CENTER | Age: 38
End: 2018-03-30

## 2019-06-03 NOTE — DISCHARGE INSTRUCTIONS
Discharge Instructions    Discharged to home by car with relative. Discharged via self, hospital escort: Refused.  Special equipment needed: Not Applicable    Be sure to schedule a follow-up appointment with your primary care doctor or any specialists as instructed.     Discharge Plan:   Diet Plan: Discussed  Activity Level: Discussed  Smoking Cessation Offered: Patient Refused  Confirmed Follow up Appointment: Patient to Call and Schedule Appointment  Confirmed Symptoms Management: Discussed  Medication Reconciliation Updated: Yes  Influenza Vaccine Indication: Not indicated: Previously immunized this influenza season and > 8 years of age    I understand that a diet low in cholesterol, fat, and sodium is recommended for good health. Unless I have been given specific instructions below for another diet, I accept this instruction as my diet prescription.   Other diet: Regular    Special Instructions: None    · Is patient discharged on Warfarin / Coumadin?   No     Depression / Suicide Risk    As you are discharged from this [facility] facility, it is important to learn how to keep safe from harming yourself.    Recognize the warning signs:  · Abrupt changes in personality, positive or negative- including increase in energy   · Giving away possessions  · Change in eating patterns- significant weight changes-  positive or negative  · Change in sleeping patterns- unable to sleep or sleeping all the time   · Unwillingness or inability to communicate  · Depression  · Unusual sadness, discouragement and loneliness  · Talk of wanting to die  · Neglect of personal appearance   · Rebelliousness- reckless behavior  · Withdrawal from people/activities they love  · Confusion- inability to concentrate     If you or a loved one observes any of these behaviors or has concerns about self-harm, here's what you can do:  · Talk about it- your feelings and reasons for harming yourself  · Remove any means that you might use to hurt  yourself (examples: pills, rope, extension cords, firearm)  · Get professional help from the community (Mental Health, Substance Abuse, psychological counseling)  · Do not be alone:Call your Safe Contact- someone whom you trust who will be there for you.  · Call your local CRISIS HOTLINE 953-5709 or 645-421-5410  · Call your local Children's Mobile Crisis Response Team [place] (599) 730-1487 or www.Berggi  · Call the toll free National Suicide Prevention Hotlines   · National Suicide Prevention Lifeline 158-774-IJTK (6740)  · [employer] Line Network 800-SUICIDE (117-0477)      Cirrhosis  Cirrhosis is long-term (chronic) liver injury. The liver is your largest internal organ, and it performs many functions. The liver converts food into energy, removes toxic material from your blood, makes important proteins, and absorbs necessary vitamins from your diet.  If you have cirrhosis, it means many of your healthy liver cells have been replaced by scar tissue. This prevents blood from flowing through your liver, which makes it difficult for your liver to function. This scarring is not reversible, but treatment can prevent it from getting worse.   CAUSES   Hepatitis C and long-term alcohol abuse are the most common causes of cirrhosis. Other causes include:  · Nonalcoholic fatty liver disease.  · Hepatitis B infection.  · Autoimmune hepatitis.  · Diseases that cause blockage of ducts inside the liver.  · Inherited liver diseases.  · Reactions to certain long-term medicines.  · Parasitic infections.  · Long-term exposure to certain toxins.  RISK FACTORS  You may have a higher risk of cirrhosis if you:  · Have certain hepatitis viruses.  · Abuse alcohol, especially if you are female.  · Are overweight.  · Share needles.  · Have unprotected sex with someone who has hepatitis.  SYMPTOMS   You may not have any signs and symptoms at first. Symptoms may not develop until the damage to your liver starts to get  worse. Signs and symptoms of cirrhosis may include:   · Tenderness in the right-upper part of your abdomen.  · Weakness and tiredness (fatigue).  · Loss of appetite.  · Nausea.  · Weight loss and muscle loss.  · Itchiness.  · Yellow skin and eyes (jaundice).  · Buildup of fluid in the abdomen (ascites).  · Swelling of the feet and ankles (edema).  · Appearance of tiny blood vessels under the skin.  · Mental confusion.  · Easy bruising and bleeding.  DIAGNOSIS   Your health care provider may suspect cirrhosis based on your symptoms and medical history, especially if you have other medical conditions or a history of alcohol abuse. Your health care provider will do a physical exam to feel your liver and check for signs of cirrhosis. Your health care provider may perform other tests, including:   · Blood tests to check:    ¨ Whether you have hepatitis B or C.    ¨ Kidney function.  ¨ Liver function.  · Imaging tests such as:  ¨ MRI or CT scan to look for changes seen in advanced cirrhosis.  ¨ Ultrasound to see if normal liver tissue is being replaced by scar tissue.  · A procedure using a long needle to take a sample of liver tissue (biopsy) for examination under a microscope. Liver biopsy can confirm the diagnosis of cirrhosis.    TREATMENT   Treatment depends on how damaged your liver is and what caused the damage. Treatment may include treating cirrhosis symptoms or treating the underlying causes of the condition to try to slow the progression of the damage. Treatment may include:  · Making lifestyle changes, such as:    ¨ Eating a healthy diet.  ¨ Restricting salt intake.   ¨ Maintaining a healthy weight.    ¨ Not abusing drugs or alcohol.  · Taking medicines to:  ¨ Treat liver infections or other infections.  ¨ Control itching.  ¨ Reduce fluid buildup.  ¨ Reduce certain blood toxins.  ¨ Reduce risk of bleeding from enlarged blood vessels in the stomach or esophagus (varices).  · If varices are causing bleeding  problems, you may need treatment with a procedure that ties up the vessels causing them to fall off (band ligation).  · If cirrhosis is causing your liver to fail, your health care provider may recommend a liver transplant.  · Other treatments may be recommended depending on any complications of cirrhosis, such as liver-related kidney failure (hepatorenal syndrome).  HOME CARE INSTRUCTIONS   · Take medicines only as directed by your health care provider. Do not use drugs that are toxic to your liver. Ask your health care provider before taking any new medicines, including over-the-counter medicines.    · Rest as needed.  · Eat a well-balanced diet. Ask your health care provider or dietitian for more information.    · You may have to follow a low-salt diet or restrict your water intake as directed.  · Do not drink alcohol. This is especially important if you are taking acetaminophen.  · Keep all follow-up visits as directed by your health care provider. This is important.  SEEK MEDICAL CARE IF:  · You have fatigue or weakness that is getting worse.  · You develop swelling of the hands, feet, legs, or face.  · You have a fever.  · You develop loss of appetite.  · You have nausea or vomiting.  · You develop jaundice.  · You develop easy bruising or bleeding.  SEEK IMMEDIATE MEDICAL CARE IF:  · You vomit bright red blood or a material that looks like coffee grounds.  · You have blood in your stools.  · Your stools appear black and tarry.  · You become confused.  · You have chest pain or trouble breathing.     This information is not intended to replace advice given to you by your health care provider. Make sure you discuss any questions you have with your health care provider.     Document Released: 12/18/2006 Document Revised: 01/08/2016 Document Reviewed: 08/26/2015  [employer] Interactive Patient Education ©2016 [employer]        Esophageal Varices  The esophagus is the passage that connects the throat to the  stomach. Esophageal varices are blood vessels in the esophagus that have become enlarged. They develop when extra blood is forced to flow through them because the blood's normal pathway is blocked. Without treatment these blood vessels eventually break and bleed (hemorrhage). A hemorrhage is life-threatening.  CAUSES  This condition may be caused by:  · Scarring of the liver (cirrhosis) due to alcoholism. This is the most common cause.  · Liver disease.  · Severe heart failure.  · A blood clot in the portal vein.  · Sarcoidosis. This is an inflammatory disease that can affect the liver.  · Schistosomiasis. This is a parasitic infection that can cause liver damage.  SYMPTOMS  Usually there are no symptoms unless the esophageal varices bleed. Symptoms of bleeding esophageal varices include:  · Vomiting material that is bright red or that is black and looks like coffee grounds.  · Coughing up blood.  · Black, tarry stools.  · Dizziness or lightheadedness.  · Low blood pressure.  · Loss of consciousness.  DIAGNOSIS  This condition is diagnosed with tests, such as:  · Endoscopy. During this test a thin, lighted tube is inserted through the mouth and into the esophagus.  · Blood tests. These may be done to check liver function, blood counts, and the body's ability to form blood clots.  TREATMENT  This condition may be treated with:  · Medicines that reduce pressure in the esophageal varices and reduce the risk of bleeding.  · Procedures to reduce pressure in the esophageal varices and reduce the risk of bleeding or stop bleeding. These include:  ¨ Variceal ligation. In this procedure, a rubber band is placed around the esophageal varices to keep them from bleeding.  ¨ Injection therapy. This treatment involves an injection that causes the esophageal varices to shrink and close (sclerotherapy). Medicines that tighten blood vessels or alter blood flow may also be used.  ¨ Balloon tamponade. In this procedure, a tube is put  into the esophagus and a balloon is passed through it and inflated.  ¨ Transjugular intrahepatic portosystemic shunt (TIPS) placement. In this procedure, a small tube is placed within the liver veins. This decreases blood flow and pressure to the esophageal varices.  · A liver transplant. This may be done if other treatments do not work.  HOME CARE INSTRUCTIONS  · Take medicines only as directed by your health care provider.  · Follow your health care provider's instructions about rest and physical activity.  SEEK IMMEDIATE MEDICAL CARE IF:  · You have any symptoms of this condition after treatment.  · You are unable to eat or drink.  · You have chest pain.     This information is not intended to replace advice given to you by your health care provider. Make sure you discuss any questions you have with your health care provider.     Document Released: 03/09/2005 Document Revised: 05/03/2016 Document Reviewed: 12/14/2015  [employer] Interactive Patient Education ©2016 [employer]     [>50% of Time Spent on Counseling for ____] : Greater than 50% of the encounter time was spent on counseling for [unfilled] [Time Spent: ___ minutes] : I have spent [unfilled] minutes of face to face time with the patient

## 2022-03-01 NOTE — CARE PLAN
Problem: Bowel/Gastric:  Goal: Will not experience complications related to bowel motility  Outcome: PROGRESSING SLOWER THAN EXPECTED  Patient complaints of diarrhea will discuss with md in rounds    Problem: Knowledge Deficit  Goal: Knowledge of the prescribed therapeutic regimen will improve  Outcome: PROGRESSING SLOWER THAN EXPECTED  Education related to etoh abuse and systemic effects.      
Problem: Infection  Goal: Will remain free from infection  Outcome: MET Date Met: 01/29/18      Problem: Bowel/Gastric:  Goal: Normal bowel function is maintained or improved  Outcome: MET Date Met: 01/29/18        
Problem: Infection  Goal: Will remain free from infection  Outcome: MET Date Met: 01/31/18      Problem: Venous Thromboembolism (VTW)/Deep Vein Thrombosis (DVT) Prevention:  Goal: Patient will participate in Venous Thrombosis (VTE)/Deep Vein Thrombosis (DVT)Prevention Measures  Outcome: MET Date Met: 01/31/18        
Problem: Safety  Goal: Will remain free from injury  Outcome: MET Date Met: 01/31/18      Problem: Pain Management  Goal: Pain level will decrease to patient's comfort goal  Outcome: MET Date Met: 01/31/18        
Problem: Safety  Goal: Will remain free from injury  Outcome: PROGRESSING AS EXPECTED  Pt AOx4, up without assistance, calls appropriately, hourly rounding completed.     Problem: Knowledge Deficit  Goal: Knowledge of disease process/condition, treatment plan, diagnostic tests, and medications will improve  Outcome: PROGRESSING AS EXPECTED  Pt updated on POC including plan for Liver US    Problem: Pain Management  Goal: Pain level will decrease to patient's comfort goal  Outcome: PROGRESSING AS EXPECTED  Pt denies need for pain medications      
Problem: Venous Thromboembolism (VTW)/Deep Vein Thrombosis (DVT) Prevention:  Goal: Patient will participate in Venous Thrombosis (VTE)/Deep Vein Thrombosis (DVT)Prevention Measures  Outcome: MET Date Met: 01/29/18        
22